# Patient Record
Sex: FEMALE | Race: BLACK OR AFRICAN AMERICAN | NOT HISPANIC OR LATINO | URBAN - METROPOLITAN AREA
[De-identification: names, ages, dates, MRNs, and addresses within clinical notes are randomized per-mention and may not be internally consistent; named-entity substitution may affect disease eponyms.]

---

## 2022-11-16 ENCOUNTER — EMERGENCY (EMERGENCY)
Facility: HOSPITAL | Age: 22
LOS: 0 days | Discharge: HOME | End: 2022-11-16
Attending: STUDENT IN AN ORGANIZED HEALTH CARE EDUCATION/TRAINING PROGRAM | Admitting: STUDENT IN AN ORGANIZED HEALTH CARE EDUCATION/TRAINING PROGRAM
Payer: SELF-PAY

## 2022-11-16 VITALS
DIASTOLIC BLOOD PRESSURE: 60 MMHG | SYSTOLIC BLOOD PRESSURE: 121 MMHG | TEMPERATURE: 97 F | OXYGEN SATURATION: 100 % | HEART RATE: 68 BPM | WEIGHT: 160.06 LBS | RESPIRATION RATE: 18 BRPM

## 2022-11-16 VITALS
OXYGEN SATURATION: 99 % | SYSTOLIC BLOOD PRESSURE: 119 MMHG | DIASTOLIC BLOOD PRESSURE: 65 MMHG | HEART RATE: 81 BPM | TEMPERATURE: 98 F | RESPIRATION RATE: 18 BRPM

## 2022-11-16 DIAGNOSIS — K08.89 OTHER SPECIFIED DISORDERS OF TEETH AND SUPPORTING STRUCTURES: ICD-10-CM

## 2022-11-16 DIAGNOSIS — K04.7 PERIAPICAL ABSCESS WITHOUT SINUS: ICD-10-CM

## 2022-11-16 PROCEDURE — 99284 EMERGENCY DEPT VISIT MOD MDM: CPT

## 2022-11-16 NOTE — ED PROVIDER NOTE - NSFOLLOWUPCLINICS_GEN_ALL_ED_FT
[facility]  Dental  [address] [place] [id number]  Phone: [phone]  Fax:   Follow Up Time: 1-3 Days

## 2022-11-16 NOTE — ED PROVIDER NOTE - PHYSICAL EXAMINATION
Physical Exam    Vital Signs: I have reviewed the initial vital signs.  Constitutional: well-nourished, appears stated age, no acute distress  Eyes: Conjunctiva pink, Sclera clear, PERRLA, EOMI.  Mouth: TTP of tooth 31/30 with peridontal erythema, no abscess noted but expressible pus from tooth socket   Musculoskeletal: supple neck, no lower extremity edema, no midline tenderness  Integumentary: warm, dry, no rash  Neurologic: awake, alert, cranial nerves II-XII grossly intact, extremities’ motor and sensory functions grossly intact  Psychiatric: appropriate mood, appropriate affect

## 2022-11-16 NOTE — ED PROVIDER NOTE - NSFOLLOWUPINSTRUCTIONS_ED_ALL_ED_FT
Follow up with your primary medical doctor in 1-2 days as well as with dental clinic    Dental Pain    Dental pain (toothache) may be caused by many things including tooth decay (cavities or caries), abscess or infection, injury, or the reason may be unknown. Your pain may only occur when you are chewing, are exposed to hot or cold temperature, are eating or drinking sugary foods or beverages, or your pain may be constant. If you were prescribed an antibiotic medicine, finish all of it even if you start to feel better. Rinsing your mouth with salt water or applying ice to the painful area of your face may help with the pain.    SEEK IMMEDIATE MEDICAL CARE IF YOU HAVE THE FOLLOWING SYMPTOMS: unable to open mouth, trouble breathing or swallowing, fever, or swelling of the face, neck or jaw.

## 2022-11-16 NOTE — ED PROVIDER NOTE - CLINICAL SUMMARY MEDICAL DECISION MAKING FREE TEXT BOX
22 y.o. female, no PMH, c/o dental abscess x2 days.  Persistent pain.  Patient denies fever/chills, nausea/vomiting, dysphagia, hematemesis, bleeding, numbness. On exam, pt in NAD, AAOx3, head NC/AT, CN II-XII intact, PEERL, EOMi, dental (+) TTP over area 30 and 31, missing teeth, (+) mild gum swelling, no drainage, airway intact, neck (-) midline tenderness, lungs CTA B/L, CV S1S2 regular, abdomen soft/NT/ND/(+)BS, ext (-) edema, motor 5/5x4, sensation intact, ambulating with steady gait. Pt will return back for dental clinic tomorrow morning

## 2022-11-16 NOTE — CONSULT NOTE ADULT - SUBJECTIVE AND OBJECTIVE BOX
Patient is a 22y old  Female who presents with a chief complaint of "I have pain on the lower right side of my face."    HPI: Patient reports that she started developing pain on the lower right side of her face since Saturday. She reports that she has had multiple abscesses in the area of #31 that she "has popped on her own" but she was "not able to pop this one." Patient reports difficulty speaking as the area hurts when her tooth touches it.       PAST MEDICAL & SURGICAL HISTORY:    ( -  ) heart valve replacement  ( -  ) joint replacement  ( -  ) pregnancy    Allergies  NKDA    *SOCIAL HISTORY: ( -  ) Tobacco; ( -  ) ETOH    *Last Dental Visit: "It's been a while"    Vital Signs Last 24 Hrs  T(C): 36.1 (16 Nov 2022 17:38), Max: 36.1 (16 Nov 2022 17:38)  T(F): 97 (16 Nov 2022 17:38), Max: 97 (16 Nov 2022 17:38)  HR: 68 (16 Nov 2022 17:38) (68 - 68)  BP: 121/60 (16 Nov 2022 17:38) (121/60 - 121/60)  BP(mean): --  RR: 18 (16 Nov 2022 17:38) (18 - 18)  SpO2: 100% (16 Nov 2022 17:38) (100% - 100%)    Parameters below as of 16 Nov 2022 17:38  Patient On (Oxygen Delivery Method): room air      EOE:  TMJ (  - ) clicks                     ( -  ) pops                     ( -  ) crepitus             Mandible <<FROM>>             Facial bones and MOM <<grossly intact>>             ( -  ) trismus             ( -  ) lymphadenopathy             ( -  ) swelling             ( - ) asymmetry             ( +  ) palpation             (  - ) dyspnea             (  - ) dysphagia             (  - ) loss of consciousness    IOE:  permanent dentition: <<grossly intact>> AND <<multiple carious teeth>>            hard/soft palate:  ( -  ) palatal torus, <<No pathology noted>>           tongue/FOM <<No pathology noted>>           labial/buccal mucosa <<No pathology noted>>           ( -  ) percussion           (  + ) palpation           ( +  ) swelling            ( +  ) abscess           ( +  ) sinus tract, through the socket of #31    Dentition present: <<y   >>  Mobility: <<y  >>  Caries: <<  y >>         *DENTAL RADIOGRAPHS: na    RADIOLOGY & ADDITIONAL STUDIES: na

## 2022-11-16 NOTE — ED PROVIDER NOTE - PATIENT PORTAL LINK FT
You can access the FollowMyHealth Patient Portal offered by [facility] by registering at the following website: [email or web address]. By joining [employer]’s FollowMyHealth portal, you will also be able to view your health information using other applications (apps) compatible with our system.

## 2022-11-16 NOTE — ED PROVIDER NOTE - OBJECTIVE STATEMENT
Pt is a 22 female with no PMH presents to ED with complaints of dentalgia. Pt states for past 1-2 days has been having R lower dental pain. Pt states pain is mild-moderate, worse with eating. Pt states noted she was able to express pus from empty tooth socket. Denies fever, chilld or body aches. denies trouble tolerating secretions

## 2022-11-16 NOTE — ED PROVIDER NOTE - NS ED ROS FT
Constitutional: (-) fever  Eyes/ENT: (-) blurry vision, (-) epistaxis (+) dental pain  Cardiovascular: (-) chest pain, (-) syncope  Respiratory: (-) cough, (-) shortness of breath  Gastrointestinal: (-) vomiting, (-) diarrhea  Musculoskeletal: (-) neck pain, (-) back pain, (-) joint pain  Integumentary: (-) rash, (-) edema  Neurological: (-) headache, (-) altered mental status  Psychiatric: (-) hallucinations  Allergic/Immunologic: (-) pruritus

## 2022-11-16 NOTE — ED PROVIDER NOTE - NS ED ATTENDING STATEMENT MOD
This was a shared visit with the DEBI. I reviewed and verified the documentation and independently performed the documented:

## 2022-11-17 ENCOUNTER — OUTPATIENT (OUTPATIENT)
Dept: OUTPATIENT SERVICES | Facility: HOSPITAL | Age: 22
LOS: 1 days | Discharge: HOME | End: 2022-11-17

## 2022-11-18 ENCOUNTER — EMERGENCY (EMERGENCY)
Facility: HOSPITAL | Age: 22
LOS: 0 days | Discharge: HOME | End: 2022-11-18
Attending: EMERGENCY MEDICINE | Admitting: EMERGENCY MEDICINE
Payer: SELF-PAY

## 2022-11-18 VITALS
HEART RATE: 79 BPM | RESPIRATION RATE: 20 BRPM | SYSTOLIC BLOOD PRESSURE: 116 MMHG | TEMPERATURE: 97 F | OXYGEN SATURATION: 98 % | DIASTOLIC BLOOD PRESSURE: 58 MMHG

## 2022-11-18 DIAGNOSIS — K08.89 OTHER SPECIFIED DISORDERS OF TEETH AND SUPPORTING STRUCTURES: ICD-10-CM

## 2022-11-18 DIAGNOSIS — K04.7 PERIAPICAL ABSCESS WITHOUT SINUS: ICD-10-CM

## 2022-11-18 PROBLEM — Z78.9 OTHER SPECIFIED HEALTH STATUS: Chronic | Status: ACTIVE | Noted: 2022-11-16

## 2022-11-18 PROCEDURE — 99282 EMERGENCY DEPT VISIT SF MDM: CPT

## 2022-11-18 NOTE — ED ADULT TRIAGE NOTE - MODE OF ARRIVAL
I reviewed the H&P, I examined the patient, and there are no changes in the patient's condition. Private Auto Walk in

## 2022-11-18 NOTE — ED ADULT NURSE NOTE - OBJECTIVE STATEMENT
patient reports Right lower dental pain . being treated with abx for abcess returns today for dental clinic .

## 2022-11-18 NOTE — ED PROVIDER NOTE - PHYSICAL EXAMINATION
CONSTITUTIONAL: in no acute distress, afebrile  SKIN: Warm, dry  HEAD: Normocephalic; atraumatic  EYES: No conjunctival injection. EOMI. PERRLA  ENT: No nasal discharge; oropharynx nonerythematous; airway clear; + ttp 31/30, no drainage; fluctuance  NECK: Supple; non tender, No JVD  NEURO: A&O x3, grossly unremarkable, no focal deficits  *Chaperone was used during the encounter. A professional environment was maintained and discussed with patient

## 2022-11-18 NOTE — ED ADULT NURSE NOTE - NSIMPLEMENTINTERV_GEN_ALL_ED
Implemented All Fall Risk Interventions:  [place] to call system. Call bell, personal items and telephone within reach. Instruct patient to call for assistance. Room bathroom lighting operational. Non-slip footwear when patient is off stretcher. Physically safe environment: no spills, clutter or unnecessary equipment. Stretcher in lowest position, wheels locked, appropriate side rails in place. Provide visual cue, wrist band, yellow gown, etc. Monitor gait and stability. Monitor for mental status changes and reorient to person, place, and time. Review medications for side effects contributing to fall risk. Reinforce activity limits and safety measures with patient and family.

## 2022-11-18 NOTE — ED PROVIDER NOTE - ATTENDING CONTRIBUTION TO CARE
22 y.o. female, no PMH, c/o dental abscess x2 days.  Patient was here 2 days ago for dental pain and told to come back to dental clinic for extraction.  Persistent pain.  Patient denies fever/chills, nausea/vomiting, dysphagia, hematemesis, bleeding, numbness. On exam, pt in NAD, AAOx3, head NC/AT, CN II-XII intact, PEERL, EOMi, dental (+) TTP over area 30 and 31, missing teeth, (+) mild gum swelling, no drainage, airway intact, neck (-) midline tenderness, lungs CTA B/L, CV S1S2 regular, abdomen soft/NT/ND/(+)BS, ext (-) edema, motor 5/5x4, sensation intact, ambulating with steady gait. Will transfer pt to dental clinic.

## 2022-11-18 NOTE — ED PROVIDER NOTE - OBJECTIVE STATEMENT
22-year-old female with no significant PMH who presents with dental abscess x2 days.  Patient was here 2 days ago for dental pain and told to come back for clinic for extraction.  Persistent pain.  Patient denies fevers, chills, nausea, vomiting, dysphagia, hematemesis, bleeding, numbness.

## 2022-11-18 NOTE — ED PROVIDER NOTE - NS ED ROS FT
Review of Systems:  CONSTITUTIONAL: No fever, No diaphoresis, No generalized weakness  SKIN: No rash  HEMATOLOGIC: No abnormal bleeding or bruising  EYES: No eye pain, No blurred vision  ENT: No change in hearing, No sore throat, No neck pain, No rhinorrhea, No ear pain; + dentalgia  MUSCULOSKELETAL: No joint paint, No swelling, No back pain  NEUROLOGIC: No numbness, No focal weakness, No headache, No dizziness  All other systems negative, unless specified in HPI

## 2023-04-07 ENCOUNTER — EMERGENCY (EMERGENCY)
Facility: HOSPITAL | Age: 23
LOS: 0 days | Discharge: HOME | End: 2023-04-07
Attending: EMERGENCY MEDICINE
Payer: MEDICAID

## 2023-04-07 VITALS
RESPIRATION RATE: 18 BRPM | HEART RATE: 69 BPM | SYSTOLIC BLOOD PRESSURE: 146 MMHG | OXYGEN SATURATION: 98 % | WEIGHT: 139.99 LBS | TEMPERATURE: 99 F | HEIGHT: 66 IN | DIASTOLIC BLOOD PRESSURE: 60 MMHG

## 2023-04-07 DIAGNOSIS — R51.9 HEADACHE, UNSPECIFIED: ICD-10-CM

## 2023-04-07 DIAGNOSIS — H53.8 OTHER VISUAL DISTURBANCES: ICD-10-CM

## 2023-04-07 DIAGNOSIS — N64.4 MASTODYNIA: ICD-10-CM

## 2023-04-07 DIAGNOSIS — N64.52 NIPPLE DISCHARGE: ICD-10-CM

## 2023-04-07 PROCEDURE — 70450 CT HEAD/BRAIN W/O DYE: CPT | Mod: 26,MA

## 2023-04-07 PROCEDURE — 99284 EMERGENCY DEPT VISIT MOD MDM: CPT | Mod: 25

## 2023-04-07 PROCEDURE — 70450 CT HEAD/BRAIN W/O DYE: CPT | Mod: MA

## 2023-04-07 PROCEDURE — 99284 EMERGENCY DEPT VISIT MOD MDM: CPT

## 2023-04-07 RX ORDER — METOCLOPRAMIDE HCL 10 MG
10 TABLET ORAL ONCE
Refills: 0 | Status: COMPLETED | OUTPATIENT
Start: 2023-04-07 | End: 2023-04-07

## 2023-04-07 RX ORDER — SODIUM CHLORIDE 9 MG/ML
1000 INJECTION INTRAMUSCULAR; INTRAVENOUS; SUBCUTANEOUS ONCE
Refills: 0 | Status: COMPLETED | OUTPATIENT
Start: 2023-04-07 | End: 2023-04-07

## 2023-04-07 RX ORDER — IBUPROFEN 200 MG
600 TABLET ORAL ONCE
Refills: 0 | Status: COMPLETED | OUTPATIENT
Start: 2023-04-07 | End: 2023-04-07

## 2023-04-07 RX ORDER — KETOROLAC TROMETHAMINE 30 MG/ML
15 SYRINGE (ML) INJECTION ONCE
Refills: 0 | Status: DISCONTINUED | OUTPATIENT
Start: 2023-04-07 | End: 2023-04-07

## 2023-04-07 RX ORDER — METOCLOPRAMIDE HCL 10 MG
1 TABLET ORAL
Qty: 15 | Refills: 0
Start: 2023-04-07 | End: 2023-04-11

## 2023-04-07 RX ADMIN — Medication 600 MILLIGRAM(S): at 20:33

## 2023-04-07 RX ADMIN — Medication 10 MILLIGRAM(S): at 20:34

## 2023-04-07 NOTE — ED PROVIDER NOTE - CLINICAL SUMMARY MEDICAL DECISION MAKING FREE TEXT BOX
jadenk: received sign out from Dr. Esquivel. Patient with headaches, getting slightly worse, also has breast symptoms and visual symptoms.  CT head with no acute abnormalities.  On reassessment states headache is stable but not worse.  Extraocular movements completely full and intact now.  No focal neurological deficits.  Well-appearing.  No signs of meningismus. In my opinion, based on current evaluation and results, an acute medical or surgical emergency does not appear to be occurring at this time and I feel that the pt is stable for further outpatient work up and/or treatment. Return precautions discussed. advised need for f/up with PMD, neuro and breast for further evaluation and testing.

## 2023-04-07 NOTE — ED PROVIDER NOTE - NSFOLLOWUPINSTRUCTIONS_ED_ALL_ED_FT
Headache    A headache is pain or discomfort felt around the head or neck area. The specific cause of a headache may not be found as there are many types including tension headaches, migraine headaches, and cluster headaches. Watch your condition for any changes. Things you can do to manage your pain include taking over the counter and prescription medications as instructed by your health care provider, lying down in a dark quiet room, limiting stress, getting regular sleep, and refraining from alcohol and tobacco products.    SEEK IMMEDIATE MEDICAL CARE IF YOU HAVE ANY OF THE FOLLOWING SYMPTOMS: fever, vomiting, stiff neck, loss of vision, problems with speech, muscle weakness, loss of balance, trouble walking, passing out, or confusion.    Breast Tenderness    Breast tenderness is a common problem for women of all ages. Breast tenderness may cause mild discomfort to severe pain. It has a variety of causes. Your health care provider will find out the likely cause of your breast tenderness by examining your breasts, asking you about symptoms, and ordering some tests.    HOME CARE INSTRUCTIONS  Breast tenderness often can be handled at home. You can try:    Getting fitted for a new bra that provides more support, especially during exercise.  Wearing a more supportive bra or sports bra while sleeping when your breasts are very tender.  If you have a breast injury, apply ice to the area:  Put ice in a plastic bag.  Place a towel between your skin and the bag.  Leave the ice on for 20 minutes, 2–3 times a day.   If your breasts are too full of milk as a result of breastfeeding, try:  Expressing milk either by hand or with a breast pump.  Applying a warm compress to the breasts for relief.  Taking over-the-counter pain relievers, if approved by your health care provider.  Taking other medicines that your health care provider prescribes. These may include antibiotic medicines or birth control pills.    Over the long term, your breast tenderness might be eased if you:    Cut down on caffeine.  Reduce the amount of fat in your diet.    Keep a log of the days and times when your breasts are most tender. This will help you and your health care provider find the cause of the tenderness and how to relieve it. Also, learn how to do breast exams at home. This will help you notice if you have an unusual growth or lump that could cause tenderness.    SEEK MEDICAL CARE IF:  Any part of your breast is hard, red, and hot to the touch. This could be a sign of infection.  Fluid is coming out of your nipples (and you are not breastfeeding). Especially watch for blood or pus.  You have a fever as well as breast tenderness.  You have a new or painful lump in your breast that remains after your menstrual period ends.  You have tried to take care of the pain at home, but it has not gone away.  Your breast pain is getting worse, or the pain is making it hard to do the things you usually do during your day.    ADDITIONAL NOTES AND INSTRUCTIONS    Please follow up with your Primary MD in 24-48 hr.  Seek immediate medical care for any new/worsening signs or symptoms. Our Emergency Department Referral Coordinators will be reaching out to you in the next 24-48 hours from 9:00am to 5:00pm with a follow up appointment. Please expect a phone call from the hospital in that time frame. If you do not receive a call or if you have any questions or concerns, you can reach them at   [phone]     Headache    A headache is pain or discomfort felt around the head or neck area. The specific cause of a headache may not be found as there are many types including tension headaches, migraine headaches, and cluster headaches. Watch your condition for any changes. Things you can do to manage your pain include taking over the counter and prescription medications as instructed by your health care provider, lying down in a dark quiet room, limiting stress, getting regular sleep, and refraining from alcohol and tobacco products.    SEEK IMMEDIATE MEDICAL CARE IF YOU HAVE ANY OF THE FOLLOWING SYMPTOMS: fever, vomiting, stiff neck, loss of vision, problems with speech, muscle weakness, loss of balance, trouble walking, passing out, or confusion.    Breast Tenderness    Breast tenderness is a common problem for women of all ages. Breast tenderness may cause mild discomfort to severe pain. It has a variety of causes. Your health care provider will find out the likely cause of your breast tenderness by examining your breasts, asking you about symptoms, and ordering some tests.    HOME CARE INSTRUCTIONS  Breast tenderness often can be handled at home. You can try:    Getting fitted for a new bra that provides more support, especially during exercise.  Wearing a more supportive bra or sports bra while sleeping when your breasts are very tender.  If you have a breast injury, apply ice to the area:  Put ice in a plastic bag.  Place a towel between your skin and the bag.  Leave the ice on for 20 minutes, 2–3 times a day.   If your breasts are too full of milk as a result of breastfeeding, try:  Expressing milk either by hand or with a breast pump.  Applying a warm compress to the breasts for relief.  Taking over-the-counter pain relievers, if approved by your health care provider.  Taking other medicines that your health care provider prescribes. These may include antibiotic medicines or birth control pills.    Over the long term, your breast tenderness might be eased if you:    Cut down on caffeine.  Reduce the amount of fat in your diet.    Keep a log of the days and times when your breasts are most tender. This will help you and your health care provider find the cause of the tenderness and how to relieve it. Also, learn how to do breast exams at home. This will help you notice if you have an unusual growth or lump that could cause tenderness.    SEEK MEDICAL CARE IF:  Any part of your breast is hard, red, and hot to the touch. This could be a sign of infection.  Fluid is coming out of your nipples (and you are not breastfeeding). Especially watch for blood or pus.  You have a fever as well as breast tenderness.  You have a new or painful lump in your breast that remains after your menstrual period ends.  You have tried to take care of the pain at home, but it has not gone away.  Your breast pain is getting worse, or the pain is making it hard to do the things you usually do during your day.    ADDITIONAL NOTES AND INSTRUCTIONS    Please follow up with your Primary MD in 24-48 hr.  Seek immediate medical care for any new/worsening signs or symptoms.

## 2023-04-07 NOTE — ED ADULT TRIAGE NOTE - CHIEF COMPLAINT QUOTE
Presented to ED c/o headache on right side of head with b/l blurry vision aggravated by light x1 week.

## 2023-04-07 NOTE — ED PROVIDER NOTE - NSFOLLOWUPCLINICS_GEN_ALL_ED_FT
[facility]  Neurology  [address] [place] [address], [address]  [place] [address]  Phone: [phone]  Fax:      [facility]  Neurology  [address] [place] [address], [address]  [place] [address]  Phone: [phone]  Fax:     [facility]  Breast  [address]  [place] [address]  Phone: [phone]  Fax:

## 2023-04-07 NOTE — ED PROVIDER NOTE - OBJECTIVE STATEMENT
23 year old F denies pmhx presenting to her with ha. Pt sts has had intermittent pressure like headache behind b/l eyes. Sts has had constant headache x 1 week feels similar to prior ha's but more persistent. + assoc visual changes, sts feels like she is looking through a kaleidoscope when ha begins. Pt also c/o 1 week of L sided breast pain and discharge. No fever/chills, neck pain/stiffness, dizziness, nausea, vomiting, chest pain, sob, abd pain, paresthesias, decreased sensation, speech changes, extrem weakness.

## 2023-04-07 NOTE — ED PROVIDER NOTE - ATTENDING APP SHARED VISIT CONTRIBUTION OF CARE
22 yo F no pmh presents with headache. Patient states that for the last few months she has been having intermittent episodes of headache. Normal pressure associated with blurry vision that she describes as looking through a kaleidoscope. States that typically will last for less than 1 hour and resolve. States that one week ago had gradual onset headache that has persisted. Having intermittent blurry vision. no numbness, tingling or weakness. no dizziness. Also reports 1 week hx of left breast pain and discharge. no similar episodes in the past.     CONSTITUTIONAL: Well-developed; well-nourished; in no acute distress.   SKIN: warm, dry. + tenderness to the 6'ocklock position on the left breast, no nipple discharge.   HEAD: Normocephalic; atraumatic.  EYES: PERRL,  no conjunctival erythema, EOMI but with downward deviation crossing the midline bilaterally   ENT: No nasal discharge; airway clear.  NECK: Supple; non tender.  CARD: S1, S2 normal;  Regular rate and rhythm.   RESP: No wheezes, rales or rhonchi.  ABD: soft non tender, non distended, no rebound or guarding  EXT: Normal ROM.  5/5 strength in all 4 extremities   LYMPH: No acute cervical adenopathy.  NEURO: A&Ox3, moving all extremities, 5/5 strength, equal sensation bilaterally, normal steady gait  PSYCH: Cooperative, appropriate.

## 2023-04-07 NOTE — ED PROVIDER NOTE - NSPTACCESSSVCSAPPTDETAILS_ED_ALL_ED_FT
Pt with L sided breast pain and discharge needs follow up. Pt with L sided breast pain and discharge needs follow up.  breast clinic as well  needs PMD within 1 week

## 2024-08-06 NOTE — ED ADULT TRIAGE NOTE - CHIEF COMPLAINT QUOTE
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